# Patient Record
Sex: MALE | Race: ASIAN | NOT HISPANIC OR LATINO | Employment: OTHER | URBAN - METROPOLITAN AREA
[De-identification: names, ages, dates, MRNs, and addresses within clinical notes are randomized per-mention and may not be internally consistent; named-entity substitution may affect disease eponyms.]

---

## 2024-02-02 ENCOUNTER — HOSPITAL ENCOUNTER (OUTPATIENT)
Dept: RADIATION ONCOLOGY | Facility: HOSPITAL | Age: 79
Setting detail: RADIATION/ONCOLOGY SERIES
Discharge: HOME | End: 2024-02-02
Payer: COMMERCIAL

## 2024-02-02 ENCOUNTER — OFFICE VISIT (OUTPATIENT)
Dept: OTOLARYNGOLOGY | Facility: HOSPITAL | Age: 79
End: 2024-02-02
Payer: COMMERCIAL

## 2024-02-02 VITALS
SYSTOLIC BLOOD PRESSURE: 131 MMHG | RESPIRATION RATE: 18 BRPM | BODY MASS INDEX: 24.94 KG/M2 | DIASTOLIC BLOOD PRESSURE: 69 MMHG | WEIGHT: 158.9 LBS | HEART RATE: 79 BPM | TEMPERATURE: 98.1 F | OXYGEN SATURATION: 94 % | HEIGHT: 67 IN

## 2024-02-02 VITALS — WEIGHT: 158.3 LBS | TEMPERATURE: 97.7 F | BODY MASS INDEX: 24.84 KG/M2 | HEIGHT: 67 IN

## 2024-02-02 DIAGNOSIS — C11.9 NASOPHARYNX CANCER (MULTI): Primary | ICD-10-CM

## 2024-02-02 DIAGNOSIS — J39.2 NASOPHARYNGEAL MASS: Primary | ICD-10-CM

## 2024-02-02 PROCEDURE — 99214 OFFICE O/P EST MOD 30 MIN: CPT | Performed by: OTOLARYNGOLOGY

## 2024-02-02 PROCEDURE — 99204 OFFICE O/P NEW MOD 45 MIN: CPT | Performed by: OTOLARYNGOLOGY

## 2024-02-02 PROCEDURE — 1160F RVW MEDS BY RX/DR IN RCRD: CPT | Performed by: OTOLARYNGOLOGY

## 2024-02-02 PROCEDURE — 99215 OFFICE O/P EST HI 40 MIN: CPT | Performed by: RADIOLOGY

## 2024-02-02 PROCEDURE — 99205 OFFICE O/P NEW HI 60 MIN: CPT | Performed by: RADIOLOGY

## 2024-02-02 PROCEDURE — 1159F MED LIST DOCD IN RCRD: CPT | Performed by: OTOLARYNGOLOGY

## 2024-02-02 PROCEDURE — 1125F AMNT PAIN NOTED PAIN PRSNT: CPT | Performed by: OTOLARYNGOLOGY

## 2024-02-02 RX ORDER — HYDROCODONE BITARTRATE AND ACETAMINOPHEN 5; 325 MG/1; MG/1
1 TABLET ORAL EVERY 4 HOURS PRN
COMMUNITY
Start: 2024-01-29

## 2024-02-02 ASSESSMENT — COLUMBIA-SUICIDE SEVERITY RATING SCALE - C-SSRS
2. HAVE YOU ACTUALLY HAD ANY THOUGHTS OF KILLING YOURSELF?: NO
6. HAVE YOU EVER DONE ANYTHING, STARTED TO DO ANYTHING, OR PREPARED TO DO ANYTHING TO END YOUR LIFE?: NO
1. IN THE PAST MONTH, HAVE YOU WISHED YOU WERE DEAD OR WISHED YOU COULD GO TO SLEEP AND NOT WAKE UP?: NO

## 2024-02-02 ASSESSMENT — ENCOUNTER SYMPTOMS
GASTROINTESTINAL NEGATIVE: 1
RESPIRATORY NEGATIVE: 1
CONSTITUTIONAL NEGATIVE: 1
NEUROLOGICAL NEGATIVE: 1
EYE PROBLEMS: 1
HEMATOLOGIC/LYMPHATIC NEGATIVE: 1
DEPRESSION: 0
OCCASIONAL FEELINGS OF UNSTEADINESS: 0
CARDIOVASCULAR NEGATIVE: 1
ENDOCRINE NEGATIVE: 1
MUSCULOSKELETAL NEGATIVE: 1
PSYCHIATRIC NEGATIVE: 1
LOSS OF SENSATION IN FEET: 0

## 2024-02-02 ASSESSMENT — PAIN SCALES - GENERAL: PAINLEVEL: 3

## 2024-02-02 ASSESSMENT — PATIENT HEALTH QUESTIONNAIRE - PHQ9
1. LITTLE INTEREST OR PLEASURE IN DOING THINGS: NOT AT ALL
2. FEELING DOWN, DEPRESSED OR HOPELESS: NOT AT ALL
SUM OF ALL RESPONSES TO PHQ9 QUESTIONS 1 & 2: 0
1. LITTLE INTEREST OR PLEASURE IN DOING THINGS: NOT AT ALL
SUM OF ALL RESPONSES TO PHQ9 QUESTIONS 1 AND 2: 0
2. FEELING DOWN, DEPRESSED OR HOPELESS: NOT AT ALL

## 2024-02-02 NOTE — PROGRESS NOTES
"Radiation Oncology Outpatient Consult    Patient Name:  Ken Reyes  MRN:  [id number]  :  1945    Referring Provider: No ref. provider found  Primary Care Provider: No primary care provider on file.  Care Team: No care team member to display    Date of Service: 2024     SUBJECTIVE  History of Present Illness:  Ken Reyes is a 78 y.o. male who was self referred    Ken Reyes is a 78 y.o. male presents for left nasal mass, biopsy showing EBV positive nasopharyngeal carcinoma.  He noticed some left nasal congestion and difficulty breathing was treated for a sinus infection without improvement.     Of note: He has a prior cancer history of a right tonsil cancer treated with right radical tonsillectomy and right neck dissection (2-4) in .  He also had a right superficial parotidectomy for pleomorphic  Adenoma with Dr. Mccabe:  \"Right radical tonsillectomy with ipsilateral SND  (Levels 2, 3, and 4), etc. . . per Dr. Mccabe…SCC (MOD.DIFF.):  *primary size - 3.5 cm (grt.dimen.)  *angio-lymphatic inv. - not identified  *margins-negative  *Right SND… LNs - Positive…(Level 2 (2.0 cm grt.dimen) without ECS     He underwent a CT scan which noted the left nasal cavity mass and further underwent a MRI and PET scan (available in PACS imaging).      CT sinus 24 reviewed:   oft tissues: A heterogeneously enhancing infiltrative soft tissue mass is  centered in the left nasal cavity measuring approximately 4.9 x 3.5 x 4.7 cm  (AP X TRV X CC). Mass extends into the bilateral ethmoid air cells,  bilateral sphenoid, and left maxillary sinuses. Posterior extension into the  nasopharynx, involvement of the bilateral medial and lateral pterygoid  muscles, and soft palate is noted. There is destruction of the left lateral  lamella, fovea ethmoidalis, cribriform plate, medial wall of the maxillary  sinus, anterior wall of the right and left sphenoid sinuses, the left middle  cranial fossa with involvement of the foramen " rotundum and pterygoid plates.  The medial wall of the left orbit is partly destroyed. Mass involves the  left pterygopalatine fossa, sphenopalatine foramen, and inferior orbital  fissure. Mass is inseparable from the left superior, middle, and inferior  nasal turbinates causing partial obstruction of the nasal cavity.       MRI 1/19/24 reviewed:  Patient has a known large mass lesion centered in the left nasal cavity   There is a small area of intracranial extension of the above described lesion  into the anterior cranial fossa measuring 6 x 3 mm. This corresponds with an  area of focal bony dehiscence on prior CT sinus.  Irregular dural thickening and enhancement over the right frontal lobe raises  the possibility of metastatic disease. Other nonspecific inflammatory  processes or subdural hemorrhage thought considerably less likely.     PET 1/24/24: Avidity and left nasopharynx, mild uptake in left tonsil (prior right radical tonsillectomy)-may be physiologic  IMPRESSION:  1. Abnormal uptake at the large infiltrating left nasal cavity malignancy as  discussed above.  2. Asymmetric FDG uptake in the left tonsil. Recommend direct visualization.  3. Couple of nonenlarged left level IIa lymph nodes with mild nonspecific  uptake. Recommend attention on follow-up.  4. FDG avid left lower lobe nodular opacity, indeterminate. Recommend  follow-up with CT chest.  5. 2.4 cm incidental left thyroid lobe nodule. Recommend non-emergent  thyroid ultrasound.       He is from [facility] and was taken to the OR by local ENT Dr. Mccabe for biopsy on 1/25/24 which showed non-keratinizing nasopharyngeal carcinoma, undifferentiated subtype, EBV-positive.  He has met with his local medical oncologist but is here today to discuss if surgical resection is an option has an appointment with Dr. Landry from radiation oncology to discuss radiation treatment (he is interested in proton beam)    He saw Dr. Mina East of ENT today and FOL  "revealed:  Procedure Note: Flexible Nasolaryngoscopy  Verbal informed consent was obtained from the patient/patient's guardian. 4% lidocaine mixed with phenylephrine was prepared and dripped into the nose. It was placed in the left naris. Following an appropriate amount of time to allow for adequate anesthesia, a flexible fiberoptic nasolaryngoscope was placed into the patient's left naris. The nasal cavity, nasopharynx, oropharynx, hypopharynx, and all endolaryngeal structures were visualized and were normal except as listed below. Significant findings included:  -Mass emanating from the left superior nasopharynx growing anteriorly into the left nasal cavity to the level of the middle turbinate  -Eustachian tube is open  -No other masses or lesions, specifically no left tonsil or base of tongue lesion  -Vocal cords are mobile, no pooling of secretions.     Currently he endorses left nasal obstruction, pain in left orbit area.  Denies epistaxis, vision changes headaches, changes in mentation, difficulty eating or drinking, weight loss, fevers, or chills, numbness or difficulty swallowing.     EORTC HN43 QOL questionnaire reveals \"a little\" pain in mouth and throat, and quite a bit-very much worry about results of tests and health in future. All other symptoms are reported as \"not at all.\"     Here today with his wife and son.  He is a retired physician (internal medicine).     Prior Radiotherapy:  No  Radiation Treatments       No radiation treatments to show. (Treatments may have been administered in another system.)          Current Systemic Treatment:  No     Presence of Pacemaker or ICD:  No    Past Medical History:  No past medical history on file.     Past Surgical History:    Past Surgical History:   Procedure Laterality Date    COMBINED LAMINECTOMY AND MICRODISECTOMY OF THORACIC / LUMBAR SPINE  2019    L4-5    EYE SURGERY Right 2017    RADICAL NECK DISSECTION Right 2004        Family History:  Cancer-related " "family history includes Colon cancer in his sister.    Social History:    Social History     Tobacco Use    Smoking status: Former     Packs/day: .25     Types: Cigarettes     Quit date:      Years since quittin.1    Smokeless tobacco: Never   Vaping Use    Vaping Use: Never used   Substance Use Topics    Alcohol use: Yes     Comment: special occasions, holidays    Drug use: Never       Allergies:  No Known Allergies     Medications:    Current Outpatient Medications:     HYDROcodone-acetaminophen (Norco) 5-325 mg tablet, Take 1 tablet by mouth every 4 hours if needed., Disp: , Rfl:       Review of Systems:  Review of Systems   Constitutional: Negative.    HENT:           Some blood when clearing nostrils, not alot   Eyes:  Positive for eye problems.        Glasses at baseline     Respiratory: Negative.     Cardiovascular: Negative.    Gastrointestinal: Negative.    Endocrine: Negative.    Genitourinary: Negative.     Musculoskeletal: Negative.    Skin: Negative.    Neurological: Negative.    Hematological: Negative.    Psychiatric/Behavioral: Negative.       The patient's current pain level was assessed.  They report currently having a pain of 3 out of 10.  They feel their pain is under control with the use of pain medications.    Performance Status:  The Karnofsky performance scale today is 100, Fully active, able to carry on all pre-disease performed without restriction (ECOG equivalent 0).        OBJECTIVE  Physical Exam:  /69   Pulse 79   Temp 36.7 °C (98.1 °F) (Temporal)   Resp 18   Ht 1.702 m (5' 7\")   Wt 72.1 kg (158 lb 14.4 oz)   SpO2 94%   BMI 24.89 kg/m²    Physical Exam   HEENT: no facial numbness, CN 2-12 grossly intact, EOMI, dentition in good repair. No lesions visualized or palpated in the oral cavity.  Heart: RRR   Lungs: CTAB  Abd: Soft, NT, ND  Ext: no CCE  Neuro: grossly intact  MSK: no spinal tenderness  Skin: no lesions visualized or palpated  Psych: patient has excellent " insight into his medical condition and is asking great questions.    Laboratory Review:  There are no laboratory contraindications to radiation therapy.    The pertinent lab results were reviewed and discussed with the patient.  Notably, EBV titer is 72.6 on 1/25/24    Pathology Review:  The pertinent pathology results were reviewed and discussed with the patient.      Left nasopharyngeal mass, biopsy:  - Non-keratinizing nasopharyngeal carcinoma, undifferentiated subtype, EBV-positive. (See comment.)    Imaging:  See results above    ASSESSMENT:  Ken Reyes is a 78 y.o. male with EBV positive nasopharynx cancer concerning for T4N1M0 with evidence of skull base invasion on recent imaging.     PLAN:    We explained to Dr. Reyes that the standard care for his condition typicallyi nvolves radiation therapy (to a dose of 70 Gy over 6-7 weeks) with concurrent chemotherapy. We discussed the potential benefit to him receiving induction chemotherapy. We referenced a study from [place] (Lilian, Lancet Oncology, 2016) that demonstrated a significant improvement in 3-year Failure-Free Survival (FFS), increasing from 72% to 80%, when induction chemotherapy was followed by chemoradiotherapy, compared to concurrent chemoradiotherapy alone. The vast majority of patients had tumors with advanced bola disease and likely high EBV titers. The main benefit of induction chemo is to decrease change for distant mets and to improve local regional control for large tumors with extensive invasion into the skull base where it is not possible to deliver a full dose of RT while protecting critical structures such as the optic nerves, eyes, brainstem, spinal cord. For his situation, he is much less likely to develop distant mets given low bola burden and low EBV titers. And given the current extent of involvement looks like full doses of RT can be delivered without compromising critical normal structures. So the benefit of induction is less clear.  However, his recent MRI does show evidence of bony dehiscence in the L anterior cranial fossa and extradural nodularity measuring 6mm and dural thickening/enhancement overlying the R frontal lobe. If both of the findings are truly concerning for cancer invovlement, then he would be more likely to benefit from induction chemo.     Another option would be for him to undergo definitive CRT followed consideration of adjuvant chemotherapy, especially if he has persistent, detectable EBV in the blood at the end of treatment.     During our discussion, we also highlighted the possible acute side effects ofradiation therapy, such as fatigue, skin redness/peeling, skin ulceration, painduring swallowing, potential need for a feeding tube, mucositis, dry mouth, andloss of taste. We further discussed the potential long-term effects, includingnon-healing skin ulceration, neck fibrosis/edema, swallowing dysfunction, and apossible sustained need for a feeding tube    I have ordered a radiology review of his recent MRI to determine the level of concern of the intracranial/skull base findings to help guide decision about induction chemotherapy. I have also offered to refer him to see our medical oncologist, Dr. Burkitt, and he will consider this.     Unfortunately, our proton unit would not be able to adequately treat his nasopharynx cancer because it utilizes passive scatter that is not highly conformal near the orbits and skull base. He has an appointment with [facility] in the future to further discuss the possible role for IMPT in treating his cancer.     NCCN Guidelines were applicable to guide this patients treatment plan.       Tristen Field RN

## 2024-02-02 NOTE — PROGRESS NOTES
"ENT New Patient Visit    Chief complaint: left nasal mass    History Of Present Illness  Ken Reyes is a 78 y.o. male presents for left nasal mass, biopsy showing EBV positive nasopharyngeal carcinoma.  He noticed some left nasal congestion and difficulty breathing was treated for a sinus infection without improvement.  He underwent a CT scan which noted the left nasal cavity mass and further underwent a MRI and PET scan (available in PACS imaging).  He is from [facility] and was taken to the OR by local ENT Dr. Mccabe for biopsy on 1/25/24 which showed non-keratinizing nasopharyngeal carcinoma, undifferentiated subtype, EBV-positive.  He has met with his local medical oncologist but is here today to discuss if surgical resection is an option has an appointment with Dr. Landry from radiation oncology to discuss radiation treatment (he is interested in proton beam)    Currently he endorses left nasal obstruction, pain in left orbit area.  Denies epistaxis, vision changes headaches, changes in mentation, difficulty eating or drinking, weight loss, fevers, or chills.    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  He also had a right superficial parotidectomy for pleomorphic  Adenoma with Dr. Mccabe:    \"Right radical tonsillectomy with ipsilateral SND  (Levels 2, 3, and 4), etc. . . per Dr. Mccabe…SCC (MOD.DIFF.):  *primary size - 3.5 cm (grt.dimen.)  *angio-lymphatic inv. - not identified  *margins-negative  *Right SND…1/40 LNs - Positive…(Level 2 (2.0 cm grt.dimen) without ECS    Here today with his wife and son.  He is a retired physician (internal medicine).        Past Medical History  He has history as above, and BPH    Surgical History  He has a past surgical history that includes Radical neck dissection (Right, 2004); Eye surgery (Right, 2017); and Combined laminectomy and microdisectomy of thoracic / " lumbar spine (2019).     Social History  He reports that he quit smoking about 49 years ago. His smoking use included cigarettes. He smoked an average of .25 packs per day. He has never used smokeless tobacco. He reports current alcohol use. He reports that he does not use drugs.    Family History  Family History   Problem Relation Name Age of Onset    Colon cancer Sister          Allergies  Patient has no known allergies.    Review of Systems     Physical Exam:  CONSTITUTIONAL:  No acute distress  VOICE:  No hoarseness or other abnormality  RESPIRATION:  Breathing comfortably, no stridor  CV:  No clubbing/cyanosis/edema in hands  EYES:  EOM intact, sclera normal  NEURO:  Alert and oriented times 3, Cranial nerves II-XII grossly intact and symmetric bilaterally  HEAD AND FACE:  Symmetric facial features, no masses or lesions, sinuses non-tender to palpation  SALIVARY GLANDS:  Parotid and submandibular glands normal bilaterally  EARS:  Normal external ears, external auditory canals, and TMs to otoscopy, normal hearing to whispered voice.  NOSE:  External nose midline, anterior rhinoscopy with drainage on left  ORAL CAVITY/OROPHARYNX/LIPS:  Normal mucous membranes, normal floor of mouth/tongue/OP, no masses or lesions, specifically minimal left tonsil tissue on left no palpable masses, endophytic, soft   PHARYNGEAL WALLS:  No masses or lesions  NECK/LYMPH:  No LAD, no thyroid masses, trachea midline  SKIN:  Neck skin has well-healed scar on right neck.  PSYCH:  Alert and oriented with appropriate mood and affect    Procedure Note: Flexible Nasolaryngoscopy  Verbal informed consent was obtained from the patient/patient's guardian. 4% lidocaine mixed with phenylephrine was prepared and dripped into the nose. It was placed in the left naris. Following an appropriate amount of time to allow for adequate anesthesia, a flexible fiberoptic nasolaryngoscope was placed into the patient's left naris. The nasal cavity,  "nasopharynx, oropharynx, hypopharynx, and all endolaryngeal structures were visualized and were normal except as listed below. Significant findings included:  -Mass emanating from the left superior nasopharynx growing anteriorly into the left nasal cavity to the level of the middle turbinate  -Eustachian tube is open  -No other masses or lesions, specifically no left tonsil or base of tongue lesion  -Vocal cords are mobile, no pooling of secretions.     Last Recorded Vitals  Temperature 36.5 °C (97.7 °F), height 1.702 m (5' 7\"), weight 71.8 kg (158 lb 4.8 oz).    Relevant Results  CT sinus 1/17/24 reviewed and these are my impression:  Hypodensity in left nasopharynx and nasal cavity, partial opacification of left maxillary, sphenoid, and frontal sinuses, lamina appears to be intact without bony erosion    MRI 1/19/24 reviewed and these are my impression:  Large mass lesion in left nasal cavity, no erosion into skull base or hard palate.    PET 1/24/24: Avidity and left nasopharynx, mild uptake in left tonsil (prior right radical tonsillectomy)-may be physiologic        Assessment and Plan  78 y.o. male with a history of right tonsil cancer s/p radical tonsillectomy and neck dissection in 2004 right parotidectomy for pleomorphic adenoma in 2004, who now presents with new left nasopharyngeal EBV positive carcinoma.     -bulky tumor extending into the nasal cavity, less into the clivus  -some activity in the left tonsil area is likely physiologic, looks normal on exam, and may just be asymmetric due to prior right radical tonsillectomy  -best option would be chemoradiation, possible induction  Discussed surgical options which are limited, it is unlikely we could clear this with surgery.   They will meet with Dr. Huffman to discuss IMRt vs proton  -RTC in 2 months for surveillance, or they may follow with their local ENT    Mina Li    "

## 2024-02-05 ENCOUNTER — HOSPITAL ENCOUNTER (OUTPATIENT)
Dept: RADIOLOGY | Facility: EXTERNAL LOCATION | Age: 79
Discharge: HOME | End: 2024-02-05

## 2024-02-05 NOTE — ADDENDUM NOTE
Encounter addended by: Candace Huffman MD on: 2/5/2024 2:42 PM   Actions taken: Visit diagnoses modified, Order list changed, Diagnosis association updated, Pend clinical note, Clinical Note Signed, Level of Service modified

## 2024-02-08 ENCOUNTER — APPOINTMENT (OUTPATIENT)
Dept: RADIATION ONCOLOGY | Facility: HOSPITAL | Age: 79
End: 2024-02-08

## 2024-02-09 ENCOUNTER — TUMOR BOARD CONFERENCE (OUTPATIENT)
Dept: HEMATOLOGY/ONCOLOGY | Facility: HOSPITAL | Age: 79
End: 2024-02-09
Payer: COMMERCIAL

## 2024-02-09 NOTE — TUMOR BOARD NOTE
[facility] HEAD AND NECK TUMOR BOARD NOTE:    Ken Reyes Is a 78 y.o. male who was presented by Dr. Candace Huffman and Dr. Mina East at [facility] Head & Neck Tumor Board on 2/9/24 which included representatives from all Head & Neck disciplines (Medical oncology/Radiation oncology/Otolaryngology/Radiology/Pathology).     History and Physical in Brief:  78 y.o. male presents for left nasal mass, biopsy showing EBV positive nasopharyngeal carcinoma.  He noticed some left nasal congestion and difficulty breathing was treated for a sinus infection without improvement.  He underwent a CT scan which noted the left nasal cavity mass and further underwent a MRI and PET scan (available in PACS imaging).  He is from [facility] and was taken to the OR by local ENT Dr. Mccabe for biopsy on 1/25/24 which showed non-keratinizing nasopharyngeal carcinoma, undifferentiated subtype, EBV-positive.     Currently he endorses left nasal obstruction, pain in left orbit area.  Denies epistaxis, vision changes headaches, changes in mentation, difficulty eating or drinking, weight loss, fevers, or chills.     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  He also had a right superficial parotidectomy for pleomorphic adenoma    Flexible laryngoscopy revealed   -Mass emanating from the left superior nasopharynx growing anteriorly into the left nasal cavity to the level of the middle turbinate  -Eustachian tube is open  -No other masses or lesions, specifically no left tonsil or base of tongue lesion  -Vocal cords are mobile, no pooling of secretions.    Imaging:  CT Sinus with Contrast (1/17/24): Outside Read  IMPRESSION:  Large infiltrating neoplasm centered in the left nasal cavity resulting in  partial obstruction of the nasal cavity with associated extension and osseous  destruction as described in detail.    Air-fluid  levels and aerated secretions in the left frontal and left  maxillary sinuses which may reflect superimposed acute sinusitis secondary to  outlet obstruction.    MRI brain advised to assess for left frontal dural involvement.        MRI Brain w/ and w/o Contrast (1/19/24) Outside Read  IMPRESSION:  Patient has a known large mass lesion centered in the left nasal cavity with  details above.    There is a small area of intracranial extension of the above described lesion  into the anterior cranial fossa measuring 6 x 3 mm. This corresponds with an  area of focal bony dehiscence on prior CT sinus.    Irregular dural thickening and enhancement over the right frontal lobe raises  the possibility of metastatic disease. Other nonspecific inflammatory  processes or subdural hemorrhage thought considerably less likely.        PET/CT Head and Neck (1/24/2024):  Outside Read  IMPRESSION:  1. Abnormal uptake at the large infiltrating left nasal cavity malignancy as  discussed above.  2. Asymmetric FDG uptake in the left tonsil. Recommend direct visualization.  3. Couple of nonenlarged left level IIa lymph nodes with mild nonspecific  uptake. Recommend attention on follow-up.  4. FDG avid left lower lobe nodular opacity, indeterminate. Recommend  follow-up with CT chest.  5. 2.4 cm incidental left thyroid lobe nodule. Recommend non-emergent  thyroid ultrasound. Reference: J Am Bandar Radiol. 2015 Feb;12(2): 143-50  I have personally reviewed the images of this examination and agree with the  resident's findings and interpretation.    Procedures to date:  Nasal endoscopy w/ biopsy 1/25/24    Pertinent Pathology:  Outside slides [id number], 1/19/2024, [place], [place].     Left nasopharyngeal mass, biopsy:  - Non-keratinizing nasopharyngeal carcinoma, undifferentiated subtype, EBV-positive. (See comment.)     The [facility] Head and Neck Tumor Board considered available treatment options and made the  following staging and recommendations:    Staging and Recommendations:    Site: left Nasopharyngeal carcinoma  Stage: T3N0M0  Review of imaging with our team reveals that this is a T3N0 lesion due to involvement of the maxillary sinus and Pterygomaxillary fossa. The density in the ethmoid sinus that is adjacent to the optic canal and tracks to the skull base appears to be predominantly fluid filled and not tumor related. We do not appreciate extension into the skull base or brain. In addition, the patient has a favorable cancer with low EBV titers <100.  Recommendation:  chemoradiation treatment  We feel that there would be less benefit for induction chemotherapy in this scenario given the favorable features of the cancer. We do not appreciate skull base invasion and/or proximity of the tumor to critical normal tissues (such as the optic nerve) that would significantly benefit from tumor downstaging with an induction chemotherapy approach. In addition, he has overall lower risk of distant mets given his low EBV titers and absence of involved lymph nodes. Induction chemotherapy could compromise his ability to receive cisplatin during radiation given its significant acute toxicities with unknown benefit. The patient is a higher risk for significant toxicity given his advanced age. We recommend he proceed with concurrent radiation and cisplatin, which has excellent chance for cure of his cancer.     Clinical Trial Status:   N/A      National site-specific guidelines were discussed with respect to the case.